# Patient Record
Sex: MALE | Race: BLACK OR AFRICAN AMERICAN | NOT HISPANIC OR LATINO
[De-identification: names, ages, dates, MRNs, and addresses within clinical notes are randomized per-mention and may not be internally consistent; named-entity substitution may affect disease eponyms.]

---

## 2021-10-18 PROBLEM — Z00.00 ENCOUNTER FOR PREVENTIVE HEALTH EXAMINATION: Status: ACTIVE | Noted: 2021-10-18

## 2021-11-02 ENCOUNTER — APPOINTMENT (OUTPATIENT)
Dept: VASCULAR SURGERY | Facility: CLINIC | Age: 86
End: 2021-11-02
Payer: MEDICARE

## 2021-11-02 DIAGNOSIS — Z87.891 PERSONAL HISTORY OF NICOTINE DEPENDENCE: ICD-10-CM

## 2021-11-02 DIAGNOSIS — Z86.79 PERSONAL HISTORY OF OTHER DISEASES OF THE CIRCULATORY SYSTEM: ICD-10-CM

## 2021-11-02 DIAGNOSIS — I25.10 ATHEROSCLEROTIC HEART DISEASE OF NATIVE CORONARY ARTERY W/OUT ANGINA PECTORIS: ICD-10-CM

## 2021-11-02 DIAGNOSIS — I10 ESSENTIAL (PRIMARY) HYPERTENSION: ICD-10-CM

## 2021-11-02 DIAGNOSIS — E78.5 HYPERLIPIDEMIA, UNSPECIFIED: ICD-10-CM

## 2021-11-02 DIAGNOSIS — Z86.39 PERSONAL HISTORY OF OTHER ENDOCRINE, NUTRITIONAL AND METABOLIC DISEASE: ICD-10-CM

## 2021-11-02 PROCEDURE — 99203 OFFICE O/P NEW LOW 30 MIN: CPT

## 2021-11-05 PROBLEM — I10 HYPERTENSION: Status: ACTIVE | Noted: 2021-11-05

## 2021-11-05 PROBLEM — Z87.891 FORMER SMOKER: Status: ACTIVE | Noted: 2021-11-05

## 2021-11-05 PROBLEM — Z86.39 HISTORY OF HYPERLIPIDEMIA: Status: RESOLVED | Noted: 2021-11-05 | Resolved: 2021-11-05

## 2021-11-05 PROBLEM — E78.5 HYPERLIPIDEMIA: Status: ACTIVE | Noted: 2021-11-05

## 2021-11-05 PROBLEM — Z86.79 HISTORY OF HYPERTENSION: Status: RESOLVED | Noted: 2021-11-05 | Resolved: 2021-11-05

## 2021-11-05 PROBLEM — I25.10 CORONARY ATHEROSCLEROSIS: Status: ACTIVE | Noted: 2021-11-05

## 2021-11-05 PROBLEM — Z86.79 HISTORY OF CORONARY ARTERY DISEASE: Status: RESOLVED | Noted: 2021-11-05 | Resolved: 2021-11-05

## 2021-11-05 RX ORDER — APIXABAN 2.5 MG/1
2.5 TABLET, FILM COATED ORAL
Refills: 0 | Status: ACTIVE | COMMUNITY

## 2021-11-05 RX ORDER — ATORVASTATIN CALCIUM 20 MG/1
20 TABLET, FILM COATED ORAL
Refills: 0 | Status: ACTIVE | COMMUNITY

## 2021-11-05 RX ORDER — HYDROCHLOROTHIAZIDE 25 MG/1
25 TABLET ORAL
Refills: 0 | Status: ACTIVE | COMMUNITY

## 2021-11-05 RX ORDER — AMLODIPINE BESYLATE 10 MG/1
10 TABLET ORAL
Refills: 0 | Status: ACTIVE | COMMUNITY

## 2021-11-05 RX ORDER — CARVEDILOL 12.5 MG/1
12.5 TABLET, FILM COATED ORAL
Refills: 0 | Status: ACTIVE | COMMUNITY

## 2021-11-05 NOTE — ASSESSMENT
[FreeTextEntry1] : Mr. Jagdish Guadarrama is a 89 year old man presenting for evaluation of bilateral lower extremity edema without the presence of any varicose veins/chronic venous stasis changes; patients primary concern is that he was told by a family member that he would require amputation of his digits.\par \par Plan\par 1. Discussed with patient that he will certainly not require amputation; patient has palpable pedal pulses and no clinical evidence of any arterial insufficiency.\par 2. Encouraged patient to continue wearing compression socks and ambulating; also keeping the leg elevated at rest to reduce swelling.\par 3. No further imaging (venous DUS) performed in the office; patient can follow up as needed if any further questions arise.

## 2021-11-05 NOTE — PHYSICAL EXAM
[Normal Breath Sounds] : Normal breath sounds [Normal Heart Sounds] : normal heart sounds [2+] : left 2+ [Ankle Swelling (On Exam)] : present [Ankle Swelling Bilaterally] : bilaterally  [Varicose Veins Of Lower Extremities] : not present [] : not present [de-identified] : Healthy appearing for age, conversational

## 2021-11-05 NOTE — END OF VISIT
[Time Spent: ___ minutes] : I have spent [unfilled] minutes of time on the encounter. [FreeTextEntry3] : I, Dr. Elier Conrad  have read and attest that all the information, medical decision making and discharge instructions within are true and accurate. I personally performed the evaluation and management (E/M) services for this new patient.  That E/M includes conducting the initial examination, assessing all conditions, and establishing the plan of care.  Today, my ACP, Rachael Osborne PA-C, was here to observe my evaluation and management services for this patient to be followed going forward.\par

## 2022-03-14 ENCOUNTER — APPOINTMENT (OUTPATIENT)
Dept: VASCULAR SURGERY | Facility: CLINIC | Age: 87
End: 2022-03-14
Payer: MEDICARE

## 2022-03-14 VITALS
HEART RATE: 94 BPM | HEIGHT: 69 IN | BODY MASS INDEX: 24.88 KG/M2 | WEIGHT: 168 LBS | SYSTOLIC BLOOD PRESSURE: 170 MMHG | DIASTOLIC BLOOD PRESSURE: 78 MMHG

## 2022-03-14 DIAGNOSIS — R60.0 LOCALIZED EDEMA: ICD-10-CM

## 2022-03-14 PROCEDURE — 99212 OFFICE O/P EST SF 10 MIN: CPT

## 2022-03-16 PROBLEM — R60.0 BILATERAL EDEMA OF LOWER EXTREMITY: Status: ACTIVE | Noted: 2021-11-05

## 2022-03-16 NOTE — ADDENDUM
[FreeTextEntry1] : I, Dr. Elier Conrad, personally performed the evaluation and management (E/M) services for this established patient who presents today with (an) existing condition(s).  That E/M includes conducting the examination, assessing all conditions, and (re)establishing/reinforcing a plan of care.  Today, my ACP, Carrie WOODY, was here to observe my evaluation and management services for this condition to be followed going forward.\par \par \par

## 2022-03-16 NOTE — PHYSICAL EXAM
[Normal Breath Sounds] : Normal breath sounds [Normal Heart Sounds] : normal heart sounds [2+] : left 2+ [Ankle Swelling (On Exam)] : present [Ankle Swelling On The Right] : of the right ankle [Ankle Swelling On The Left] : moderate [No Rash or Lesion] : No rash or lesion [Calm] : calm [Varicose Veins Of Lower Extremities] : not present [] : not present [de-identified] : Healthy appearing for age, conversational [de-identified] : +FROM 5/5x4 [de-identified] : grossly intact

## 2022-03-16 NOTE — ASSESSMENT
[FreeTextEntry1] : 89 yo male with HTN, stable coronary disease, returns for a f/u on his right lower extremity edema. Patient reports that he has had swelling of his lower extremities for many years, but he states that his edema is stable. He wears compression stockings and denies any skin breakdown.\par RLE with moderate lower leg edema, however both legs are well perfused, no skin breakdown.\par Patient was recommended to continue wearing compression stockings over RLE.\par He may f/u here as needed. [Arterial/Venous Disease] : arterial/venous disease

## 2022-05-25 NOTE — HISTORY OF PRESENT ILLNESS
[FreeTextEntry1] : Mr. Jagdish Guadarrama is a 89 year old man with HTN, stable coronary disease, presenting to the vascular clinic after referral from Dr. Almonte for evaluation of bilateral lower extremity edema. Patient reports that he has had swelling of his lower extremities for many years, and that recently has noticed his right foot swelling getting worse. He is concerned because he was told by his sister (not a medical professional) that he may need to get all of his toes amputated. The patient does not have any varicose veins, open/closed ulcers. He is able to fully ambulate without any limitations.
detailed exam